# Patient Record
Sex: MALE | Race: WHITE | Employment: UNEMPLOYED | URBAN - METROPOLITAN AREA
[De-identification: names, ages, dates, MRNs, and addresses within clinical notes are randomized per-mention and may not be internally consistent; named-entity substitution may affect disease eponyms.]

---

## 2024-03-18 ENCOUNTER — OFFICE VISIT (OUTPATIENT)
Dept: DENTISTRY | Facility: CLINIC | Age: 34
End: 2024-03-18

## 2024-03-18 VITALS — DIASTOLIC BLOOD PRESSURE: 84 MMHG | HEART RATE: 57 BPM | SYSTOLIC BLOOD PRESSURE: 124 MMHG | TEMPERATURE: 99.1 F

## 2024-03-18 DIAGNOSIS — Z01.20 ENCOUNTER FOR DENTAL EXAMINATION: Primary | ICD-10-CM

## 2024-03-18 PROCEDURE — D0140 LIMITED ORAL EVALUATION - PROBLEM FOCUSED: HCPCS

## 2024-03-18 PROCEDURE — D0220 INTRAORAL - PERIAPICAL FIRST RADIOGRAPHIC IMAGE: HCPCS

## 2024-03-18 PROCEDURE — D0230 INTRAORAL - PERIAPICAL EACH ADDITIONAL RADIOGRAPHIC IMAGE: HCPCS

## 2024-03-18 NOTE — PROGRESS NOTES
"Dental procedures in this visit     - LIMITED ORAL EVALUATION - PROBLEM FOCUSED (Completed)     Service provider: Robin Blackburn DMD     Billing provider: Robin Blackburn DMD     - INTRAORAL - PERIAPICAL FIRST RADIOGRAPHIC IMAGE 5,13 (Completed)     Service provider: Robin Blackburn DMD     Billing provider: Robin Blackburn DMD     - INTRAORAL - PERIAPICAL EACH ADDITIONAL RADIOGRAPHIC IMAGE 13 (Completed)     Service provider: Robin Blackburn DMD     Billing provider: Robin Blackburn DMD     Subjective   Patient ID: Arun Dior is a 33 y.o. male.  No chief complaint on file.    HPI  The following portions of the chart were reviewed this encounter and updated as appropriate:    No text in SmartText         \"I have a tooth on the upper left and right that are broken. 5/10 pain.\"    Objective   Soft Tissue Exam  No findings documented this visit      Dental Exam    Radiographic Interpretation:   Associated radiographs for today's visit were reviewed and finding(s) were discussed with the patient.   Findings include: PA tooth #5 and #13 grossly carious  Hard Tissue Exam:  Decay noted - see charting and treatment plan and Fractured restorations/teeth    Assessment/Plan   Problem List Items Addressed This Visit    None  Visit Diagnoses       Encounter for dental examination    -  Primary    Relevant Orders    Ambulatory referral to Oral Maxillofacial Surgery    5,13 INTRAORAL - PERIAPICAL FIRST RADIOGRAPHIC IMAGE (Completed)    13 INTRAORAL - PERIAPICAL EACH ADDITIONAL RADIOGRAPHIC IMAGE (Completed)    LIMITED ORAL EVALUATION - PROBLEM FOCUSED (Completed)        34 yo male present with grossly carious #5, 13. 5/10 pain reported. #5, 13 non restorable and warrant extraction. Due to complexity of these extractions, I recommend the patient be referred to an Oral Surgeon. Patient agreed to the plan. A referral was placed to OS, and a copy of his x-rays printed.    NV: " ASAP referral to OS ext #5, 13

## 2024-05-24 ENCOUNTER — OFFICE VISIT (OUTPATIENT)
Dept: FAMILY MEDICINE CLINIC | Facility: CLINIC | Age: 34
End: 2024-05-24

## 2024-05-24 VITALS
BODY MASS INDEX: 30.35 KG/M2 | DIASTOLIC BLOOD PRESSURE: 71 MMHG | WEIGHT: 212 LBS | HEART RATE: 65 BPM | OXYGEN SATURATION: 96 % | SYSTOLIC BLOOD PRESSURE: 114 MMHG | RESPIRATION RATE: 16 BRPM | HEIGHT: 70 IN | TEMPERATURE: 98 F

## 2024-05-24 DIAGNOSIS — Z00.00 VISIT FOR ANNUAL HEALTH EXAMINATION: Primary | ICD-10-CM

## 2024-05-24 PROBLEM — Z72.0 VAPES NICOTINE CONTAINING SUBSTANCE: Status: ACTIVE | Noted: 2024-05-24

## 2024-05-24 PROCEDURE — 99385 PREV VISIT NEW AGE 18-39: CPT | Performed by: FAMILY MEDICINE

## 2024-05-24 NOTE — PROGRESS NOTES
Adult Annual Physical  Name: Arun Dior      : 1990      MRN: [id number]  Encounter Provider: Lyubov Diaz MD  Encounter Date: 2024   Encounter department: [facility]    Assessment & Plan   1. Visit for annual health examination  -     Hepatitis C antibody; Future  -     HIV 1/2 AG/AB w Reflex [facility] for 2 yr old and above; Future  2. BMI 30.0-30.9,adult  -     Comprehensive metabolic panel; Future  -     Lipid panel; Future    Immunizations and preventive care screenings were discussed with patient today. Appropriate education was printed on patient's after visit summary.    Counseling:  Alcohol/drug use: discussed moderation in alcohol intake, the recommendations for healthy alcohol use, and avoidance of illicit drug use.  Dental Health: discussed importance of regular tooth brushing, flossing, and dental visits.  Injury prevention: discussed safety/seat belts, safety helmets, smoke detectors, carbon dioxide detectors, and smoking near bedding or upholstery.  Sexual health: discussed sexually transmitted diseases, partner selection, use of condoms, avoidance of unintended pregnancy, and contraceptive alternatives.  Exercise: the importance of regular exercise/physical activity was discussed. Recommend exercise 3-5 times per week for at least 30 minutes.     BMI Counseling: Body mass index is 30.42 kg/m². The BMI is above normal. Nutrition recommendations include decreasing portion sizes, encouraging healthy choices of fruits and vegetables, decreasing fast food intake, consuming healthier snacks, limiting drinks that contain sugar, moderation in carbohydrate intake, increasing intake of lean protein, reducing intake of saturated and trans fat and reducing intake of cholesterol. Exercise recommendations include moderate physical activity 150 minutes/week. Rationale for BMI follow-up plan is due to patient being overweight or obese.     Depression  "Screening and Follow-up Plan: Patient was screened for depression during today's encounter. They screened negative with a PHQ-2 score of 0.        History of Present Illness     Adult Annual Physical:  Patient presents for annual physical.     Diet and Physical Activity:  - Diet/Nutrition: well balanced diet.  - Exercise: moderate cardiovascular exercise.    Depression Screening:  - PHQ-2 Score: 0    General Health:  - Sleep: sleeps well.  - Hearing: normal hearing right ear.  - Vision: no vision problems.    Review of Systems   Constitutional:  Negative for chills and fever.   HENT:  Negative for ear pain and sore throat.    Eyes:  Negative for pain and visual disturbance.   Respiratory:  Negative for cough and shortness of breath.    Cardiovascular:  Negative for chest pain and palpitations.   Gastrointestinal:  Negative for abdominal pain and vomiting.   Genitourinary:  Negative for dysuria and hematuria.   Musculoskeletal:  Negative for arthralgias and back pain.   Skin:  Negative for color change and rash.   Neurological:  Negative for seizures and syncope.   All other systems reviewed and are negative.        Objective     /71 (BP Location: Right arm, Patient Position: Sitting, Cuff Size: Large)   Pulse 65   Temp 98 °F (36.7 °C) (Temporal)   Resp 16   Ht 5' 10\" (1.778 m)   Wt 96.2 kg (212 lb)   SpO2 96%   BMI 30.42 kg/m²     Physical Exam  Vitals and nursing note reviewed.   Constitutional:       General: He is not in acute distress.     Appearance: Normal appearance. He is well-developed. He is obese. He is not ill-appearing, toxic-appearing or diaphoretic.   HENT:      Head: Normocephalic and atraumatic.      Right Ear: Tympanic membrane, ear canal and external ear normal.      Left Ear: Tympanic membrane, ear canal and external ear normal.      Nose: Nose normal.      Mouth/Throat:      Mouth: Mucous membranes are moist.   Eyes:      Conjunctiva/sclera: Conjunctivae normal.   Cardiovascular:    "   Rate and Rhythm: Normal rate and regular rhythm.      Heart sounds: No murmur heard.  Pulmonary:      Effort: Pulmonary effort is normal. No respiratory distress.      Breath sounds: Normal breath sounds.   Abdominal:      Palpations: Abdomen is soft.      Tenderness: There is no abdominal tenderness.   Musculoskeletal:         General: No swelling.      Cervical back: Neck supple.      Right lower leg: No edema.      Left lower leg: No edema.   Skin:     General: Skin is warm and dry.      Capillary Refill: Capillary refill takes less than 2 seconds.   Neurological:      Mental Status: He is alert.   Psychiatric:         Mood and Affect: Mood normal.         Behavior: Behavior normal.         Thought Content: Thought content normal.         Judgment: Judgment normal.       Administrative Statements

## 2024-06-15 ENCOUNTER — HOSPITAL ENCOUNTER (EMERGENCY)
Facility: HOSPITAL | Age: 34
Discharge: HOME | End: 2024-06-15
Attending: EMERGENCY MEDICINE | Admitting: EMERGENCY MEDICINE

## 2024-06-15 VITALS
TEMPERATURE: 98.4 F | HEART RATE: 72 BPM | SYSTOLIC BLOOD PRESSURE: 126 MMHG | DIASTOLIC BLOOD PRESSURE: 81 MMHG | WEIGHT: 205.91 LBS | OXYGEN SATURATION: 94 % | RESPIRATION RATE: 20 BRPM | BODY MASS INDEX: 29.54 KG/M2

## 2024-06-15 DIAGNOSIS — F43.20 ADJUSTMENT REACTION OF ADULT LIFE: Primary | ICD-10-CM

## 2024-06-15 LAB
AMPHETAMINES SERPL QL SCN: NEGATIVE
BARBITURATES UR QL: NEGATIVE
BENZODIAZ UR QL: NEGATIVE
COCAINE UR QL: NEGATIVE
ETHANOL EXG-MCNC: 0 MG/DL
FENTANYL UR QL SCN: NEGATIVE
HYDROCODONE UR QL SCN: NEGATIVE
METHADONE UR QL: NEGATIVE
OPIATES UR QL SCN: NEGATIVE
OXYCODONE+OXYMORPHONE UR QL SCN: NEGATIVE
PCP UR QL: NEGATIVE
THC UR QL: NEGATIVE

## 2024-06-15 PROCEDURE — 99284 EMERGENCY DEPT VISIT MOD MDM: CPT | Performed by: EMERGENCY MEDICINE

## 2024-06-15 PROCEDURE — 99285 EMERGENCY DEPT VISIT HI MDM: CPT

## 2024-06-15 PROCEDURE — 80307 DRUG TEST PRSMV CHEM ANLYZR: CPT | Performed by: EMERGENCY MEDICINE

## 2024-06-15 PROCEDURE — 82075 ASSAY OF BREATH ETHANOL: CPT | Performed by: EMERGENCY MEDICINE

## 2024-06-15 NOTE — ED PROVIDER NOTES
History  Chief Complaint   Patient presents with    Psychiatric Evaluation     Pt arrived with [employer] and crisis. As per pt girlfriend pt has been having suicidal ideations and banging his head on the wall. Pt denies SI/HI/AH/VH. Calm and cooperative during triage.      HPI      All information was obtained via  at the bedside.    This is a 33-year-old male presents to the emergency department brought in here on a 302 petition by his current girlfriend.  Patient [place]  Patient does not have any relevant past medical history, surgical history or psychiatric diagnoses.  Went to a [employer] in [facility] and has high degree of education is bachelor level college courses in [facility].  She denies any alcohol consumption, illicit drug use, theatric diagnoses, take any medications on a regular basis.  Upon further questioning patient was questioned about the contents of the 302 which states that he has a history of depression is not sleeping or eating denies this.  Is also stated that he is having visual hallucinations crying often, patient also denies this.  On the 302 and stated that he was reacting to internal stimuli today and stated that he wanted to go and kill himself and be run over by a truck, patient denies this and during my assessment he did not appear to be reacting to internal stimuli.  Patient offers no other complaints at this time patient is cooperative.  Patient noted that when long enforcement showed up he only came to the hospital without any issue.    Recent life stressors are the following: Patient was with his girlfriend for 2 years, he shares twins with this individual, children are currently 1 years of age and and are health.  Patient recently broke up with individual and currently dating a new girlfriend for approximately 2 months, he wanted to leave this individual attempted to pack his bags in the apartment and the  next thing he noted please was at a store taking him to the hospital.  None       History reviewed. No pertinent past medical history.    History reviewed. No pertinent surgical history.    History reviewed. No pertinent family history.  I have reviewed and agree with the history as documented.    E-Cigarette/Vaping    E-Cigarette Use Current Some Day User      E-Cigarette/Vaping Substances     Social History     Tobacco Use    Smoking status: Never    Smokeless tobacco: Never   Vaping Use    Vaping status: Some Days   Substance Use Topics    Alcohol use: Yes    Drug use: Never       Review of Systems   Constitutional: Negative.  Negative for chills and fever.   HENT: Negative.     Eyes: Negative.    Respiratory: Negative.  Negative for cough, chest tightness and shortness of breath.    Cardiovascular: Negative.  Negative for chest pain, palpitations and leg swelling.   Gastrointestinal: Negative.    Endocrine: Negative.    Genitourinary: Negative.    Musculoskeletal: Negative.    Skin: Negative.    Allergic/Immunologic: Negative.    Neurological: Negative.    Hematological: Negative.    Psychiatric/Behavioral: Negative.  Negative for agitation, behavioral problems, confusion, decreased concentration, dysphoric mood, hallucinations, self-injury, sleep disturbance and suicidal ideas. The patient is not nervous/anxious and is not hyperactive.        Physical Exam  Physical Exam  Vitals and nursing note reviewed.   Constitutional:       General: He is not in acute distress.     Appearance: Normal appearance. He is normal weight. He is not ill-appearing, toxic-appearing or diaphoretic.   HENT:      Head: Normocephalic and atraumatic.      Right Ear: External ear normal.      Left Ear: External ear normal.      Nose: Nose normal.      Mouth/Throat:      Mouth: Mucous membranes are moist.      Pharynx: Oropharynx is clear.   Eyes:      Extraocular Movements: Extraocular movements intact.      Conjunctiva/sclera:  Conjunctivae normal.      Pupils: Pupils are equal, round, and reactive to light.   Cardiovascular:      Rate and Rhythm: Normal rate and regular rhythm.      Pulses: Normal pulses.      Heart sounds: Normal heart sounds.   Pulmonary:      Effort: Pulmonary effort is normal.      Breath sounds: Normal breath sounds.   Abdominal:      General: Abdomen is flat. Bowel sounds are normal.   Musculoskeletal:         General: Normal range of motion.      Cervical back: Normal range of motion.   Skin:     General: Skin is warm.      Capillary Refill: Capillary refill takes less than 2 seconds.   Neurological:      General: No focal deficit present.      Mental Status: He is alert and oriented to person, place, and time. Mental status is at baseline.   Psychiatric:         Mood and Affect: Mood normal.         Behavior: Behavior normal.         Thought Content: Thought content normal.         Judgment: Judgment normal.         Vital Signs  ED Triage Vitals [06/15/24 1228]   Temperature Pulse Respirations Blood Pressure SpO2   98.4 °F (36.9 °C) 72 20 126/81 94 %      Temp Source Heart Rate Source Patient Position - Orthostatic VS BP Location FiO2 (%)   Oral Monitor Sitting Right arm --      Pain Score       --           Vitals:    06/15/24 1228   BP: 126/81   Pulse: 72   Patient Position - Orthostatic VS: Sitting         Visual Acuity      ED Medications  Medications - No data to display    Diagnostic Studies  Results Reviewed       Procedure Component Value Units Date/Time    Rapid drug screen, urine [[id number]]  (Normal) Collected: 06/15/24 1302    Lab Status: Final result Specimen: Urine, Clean Catch Updated: 06/15/24 1325     Amph/Meth UR Negative     Barbiturate Ur Negative     Benzodiazepine Urine Negative     Cocaine Urine Negative     Methadone Urine Negative     Opiate Urine Negative     PCP Ur Negative     THC Urine Negative     Oxycodone Urine Negative     Fentanyl Urine Negative     HYDROCODONE URINE Negative     "Narrative:      FOR MEDICAL PURPOSES ONLY.   IF CONFIRMATION NEEDED PLEASE CONTACT THE LAB WITHIN 5 DAYS.    Drug Screen Cutoff Levels:  AMPHETAMINE/METHAMPHETAMINES  1000 ng/mL  BARBITURATES     200 ng/mL  BENZODIAZEPINES     200 ng/mL  COCAINE      300 ng/mL  METHADONE      300 ng/mL  OPIATES      300 ng/mL  PHENCYCLIDINE     25 ng/mL  THC       50 ng/mL  OXYCODONE      100 ng/mL  FENTANYL      5 ng/mL  HYDROCODONE     300 ng/mL    POCT alcohol breath test [[id number]]  (Normal) Resulted: 06/15/24 1303    Lab Status: Final result Updated: 06/15/24 1303     EXTBreath Alcohol 0.00                   No orders to display              Procedures  Procedures         ED Course  ED Course as of 06/15/24 1651   Sat Isaac 15, 2024   1247 Patient seen and evaluated, orders placed.  Arrived here on 302 hold, patient denies all contents in the 302 paperwork submitted.  No prior history of psychiatric illnesses or past medical history.  See HPI for specifics, currently in a an estranged relationship with a third-party female    Brief focused differential dx in this patient is as follows:  Inconsistent history due to significant other conflict vs. Depression.   1336 Spoke w/ the petitioner, Catalina Kelley in the emergency department, her contact information is as follows: [phone].  She confirms everything that was written on the petition, explained to her that patient is denying everything has been cooperative during the emergency department visit, will have to have crisis evaluation.   1337 Patient medically cleared for crisis evaluation, secure chat sent to crisis worker on call   1355 RN staff spoke with sylvia who showed a screenshot of a \"conversation\" between patient and sister about pt claiming thoughts of self harm, parties involved in the \"screenshot text message\" could not be corroborated.    1621 Rounded with Tasha Herman crisis worker.   1637 Patient assessed by eva Li on call at [facility], no " "clinical grounds for a 302 admission, patient should have a 302 denied, no SI, no HI.                               SBIRT 20yo+      Flowsheet Row Most Recent Value   Initial Alcohol Screen: US AUDIT-C     1. How often do you have a drink containing alcohol? 0 Filed at: 06/15/2024 1248   2. How many drinks containing alcohol do you have on a typical day you are drinking?  0 Filed at: 06/15/2024 1248   3a. Male UNDER 65: How often do you have five or more drinks on one occasion? 0 Filed at: 06/15/2024 1248   Audit-C Score 0 Filed at: 06/15/2024 1248   ELISE: How many times in the past year have you...    Used an illegal drug or used a prescription medication for non-medical reasons? Never Filed at: 06/15/2024 1248                      Medical Decision Making  Patient seen and evaluated by crisis, please refer to their documentation, no clinical indication for admission to the hospital, patient denies all accusations in the 302, patient been cooperative during his ER evaluation, patient stable for discharge.    Portions of the record may have been created with voice recognition software. Occasional wrong word or \"sound a like\" substitutions may have occurred due to the inherent limitations of voice recognition software. Read the chart carefully and recognize, using context, where substitutions have occurred.       Counseling: I had a detailed discussion with the patient and/or guardian regarding: the historical points, exam findings, and any diagnostic results supporting the discharge diagnosis, lab results, radiology results, discharge instructions reviewed with patient and/or family/caregiver and understanding was verbalized. Instructions given to return to the emergency department if symptoms worsen or persist, or if there are any questions or concerns that arise at home.        Amount and/or Complexity of Data Reviewed  Labs: ordered.             Disposition  Final diagnoses:   Adjustment reaction of adult life "     Time reflects when diagnosis was documented in both MDM as applicable and the Disposition within this note       Time User Action Codes Description Comment    6/15/2024  4:39 PM Brian Cifuentes Add [F43.20] Adjustment reaction of adult life           ED Disposition       ED Disposition   Discharge    Condition   Stable    Date/Time   Sat Isaac 15, 2024 1639    Comment   Arun Dior discharge to home/self care.                   Follow-up Information    None         Patient's Medications    No medications on file       No discharge procedures on file.    PDMP Review       None            ED Provider  Electronically Signed by             Brian Cifuentes III, DO  06/15/24 9380

## 2024-06-15 NOTE — ED NOTES
Pt seen by crisis and determined it was safe for him to go  home     Thalia Davies RN  06/15/24 2969

## 2024-06-16 NOTE — ED NOTES
This writer discussed the patient's current presentation and recommended discharge plan with Brian Cifuentes DO, the ED attending.  He agrees with the patient being discharged at this time with information for [facility] Intake and Referral Walk-In Hours.     The patient was Instructed to follow up with [facility]   The patient was provided with referral information for: [facility] resources.      This writer and the patient completed a safety plan.  The patient was provided with a copy of their safety plan with encouragement to utilize the plan following discharge.   In addition, the patient was instructed to call [facility] crisis, other crisis services, 911 or to go to the nearest ER immediately if their situation changes at any time.     This writer discussed discharge plans with the patient, who agrees with and understands the discharge plans.       SAFETY PLAN    Warning Signs (thoughts, images, mood, behavior, situations) of a potential crisis: Frustration, pent up anger, being asked to commit to a relationship that has been unhealthy.      Coping Skills (what can I do to take my mind off the problem, or to keep myself safe): Exercise, Talk with family, friends, listen to music.      Outside Support (who can I reach out to for support and help): Family

## 2024-06-16 NOTE — ED NOTES
Patient was seen with translation by Rober, #[id number], as patient speaks exclusively Surinamese. Patient arrived with police on a [facility] 302 warrant, filed by the mother of his year-old twins. The petitioner was the mother of the twins, with whom the patient has been residing. She had apparently petitioned using content from texts that the patient had sent to his sister, which the patient surmised as, when told about the 302 claims, he recognized some of the statements he had previously made to his sister.  Patient stated he is frustrated and depressed due to his current situation. He stated he has been trying to delineate expectations of the mother of his twins. He stated she has behaved in a jealous and possessive manner. He believes she hid his phone as she does not want him to speak to the mother of his 7 y/o son. He stated they have been sharing a place for a little over a month and she has been pressing for him to say he loves her., but he is in love with someone else. He stated that it has become overwhelming as he needs to have access to his children and to support them. Today, when he could not find his phone, he confronted her and she denied hiding it. Patient stated that this upset him to the point that he packed his bags to leave. At that point, police showed up. Patient stated he has been under significant stress but would never take his own life as his children and his mother depend upon him. Patient denied HI. He denied A/VH. He does not present with obvious delusional material. He has good eye contact, appears to be caring for himself. Patient stated he is planning to move to the home of a friend. Patient does not require an inpatient admission at this time. 302 was NSMD'd by Dr. Cifuentes, the ED attending. Call placed to [facility] Crisis. Spoke to Natalia CEJA, who will dispatch a worker to  the original.